# Patient Record
Sex: FEMALE | Race: WHITE | NOT HISPANIC OR LATINO | URBAN - METROPOLITAN AREA
[De-identification: names, ages, dates, MRNs, and addresses within clinical notes are randomized per-mention and may not be internally consistent; named-entity substitution may affect disease eponyms.]

---

## 2020-06-15 ENCOUNTER — OFFICE VISIT (OUTPATIENT)
Dept: URBAN - METROPOLITAN AREA CLINIC 68 | Facility: CLINIC | Age: 75
End: 2020-06-15

## 2020-06-24 ENCOUNTER — OFFICE VISIT (OUTPATIENT)
Dept: URBAN - METROPOLITAN AREA SURGERY CENTER 12 | Facility: SURGERY CENTER | Age: 75
End: 2020-06-24

## 2020-06-25 ENCOUNTER — LAB OUTSIDE AN ENCOUNTER (OUTPATIENT)
Dept: URBAN - METROPOLITAN AREA CLINIC 68 | Facility: CLINIC | Age: 75
End: 2020-06-25

## 2020-06-25 LAB — 01: (no result)

## 2020-06-26 ENCOUNTER — TELEPHONE ENCOUNTER (OUTPATIENT)
Dept: URBAN - METROPOLITAN AREA CLINIC 68 | Facility: CLINIC | Age: 75
End: 2020-06-26

## 2020-07-17 ENCOUNTER — OFFICE VISIT (OUTPATIENT)
Dept: URBAN - METROPOLITAN AREA CLINIC 68 | Facility: CLINIC | Age: 75
End: 2020-07-17

## 2020-09-01 ENCOUNTER — OFFICE VISIT (OUTPATIENT)
Dept: URBAN - METROPOLITAN AREA CLINIC 68 | Facility: CLINIC | Age: 75
End: 2020-09-01

## 2021-03-29 ENCOUNTER — OFFICE VISIT (OUTPATIENT)
Dept: URBAN - METROPOLITAN AREA CLINIC 66 | Facility: CLINIC | Age: 76
End: 2021-03-29

## 2022-03-29 ENCOUNTER — OFFICE VISIT (OUTPATIENT)
Dept: URBAN - METROPOLITAN AREA CLINIC 68 | Facility: CLINIC | Age: 77
End: 2022-03-29

## 2022-06-04 ENCOUNTER — TELEPHONE ENCOUNTER (OUTPATIENT)
Dept: URBAN - METROPOLITAN AREA CLINIC 68 | Facility: CLINIC | Age: 77
End: 2022-06-04

## 2022-06-04 RX ORDER — FLUCONAZOLE 200 MG/1
TABLET ORAL
Qty: 21 | Refills: 0 | OUTPATIENT
Start: 2019-03-04 | End: 2019-03-25

## 2022-06-04 RX ORDER — SODIUM SULFATE, POTASSIUM SULFATE, MAGNESIUM SULFATE 17.5; 3.13; 1.6 G/ML; G/ML; G/ML
SOLUTION, CONCENTRATE ORAL AS DIRECTED
Qty: 1 | Refills: 0 | OUTPATIENT
Start: 2019-01-30 | End: 2019-01-31

## 2022-06-04 RX ORDER — ALBUTEROL SULFATE 90 UG/1
PROAIR HFA( 108 (90 BASE)MCG/ACT INHALATION  AS NEEDED ) INACTIVE -HX ENTRY AEROSOL, METERED RESPIRATORY (INHALATION) AS NEEDED
OUTPATIENT
Start: 2018-01-15

## 2022-06-04 RX ORDER — ESTRADIOL 0.03 MG/D
VIVELLE-DOT( 0.025MG/24HR TRANSDERMAL  2 TIMES A WEEK ) INACTIVE -HX ENTRY PATCH, EXTENDED RELEASE TRANSDERMAL
OUTPATIENT
Start: 2018-01-15

## 2022-06-04 RX ORDER — POLYETHYLENE GLYCOL 3350, SODIUM SULFATE, SODIUM CHLORIDE, POTASSIUM CHLORIDE, ASCORBIC ACID, SODIUM ASCORBATE 7.5-2.691G
KIT ORAL
Qty: 1 | Refills: 0 | OUTPATIENT
Start: 2014-04-01 | End: 2016-03-14

## 2022-06-04 RX ORDER — ROSUVASTATIN CALCIUM 20 MG
CRESTOR( 20MG ORAL  DAILY ) INACTIVE -HX ENTRY TABLET ORAL DAILY
OUTPATIENT
Start: 2018-01-15

## 2022-06-05 ENCOUNTER — TELEPHONE ENCOUNTER (OUTPATIENT)
Dept: URBAN - METROPOLITAN AREA CLINIC 68 | Facility: CLINIC | Age: 77
End: 2022-06-05

## 2022-06-05 RX ORDER — LEVOTHYROXINE SODIUM 75 UG/1
SYNTHROID( 75MCG ORAL  4 TIMES A WEEK ) ACTIVE -HX ENTRY TABLET ORAL
Status: ACTIVE | COMMUNITY
Start: 2021-03-29

## 2022-06-05 RX ORDER — ESOMEPRAZOLE MAGNESIUM 40 MG
NEXIUM( 40MG ORAL  DAILY ) ACTIVE -HX ENTRY CAPSULE,DELAYED RELEASE (ENTERIC COATED) ORAL DAILY
Status: ACTIVE | COMMUNITY
Start: 2021-03-29

## 2022-06-05 RX ORDER — LEVOTHYROXINE SODIUM 88 UG/1
SYNTHROID( 88MCG ORAL  3 TIMES A WEEK ) ACTIVE -HX ENTRY TABLET ORAL
Status: ACTIVE | COMMUNITY
Start: 2021-03-29

## 2022-06-05 RX ORDER — ATORVASTATIN CALCIUM 40 MG/1
ATORVASTATIN CALCIUM( 40MG ORAL  DAILY ) ACTIVE -HX ENTRY TABLET, FILM COATED ORAL DAILY
Status: ACTIVE | COMMUNITY
Start: 2021-03-29

## 2022-06-05 RX ORDER — ALBUTEROL SULFATE 90 UG/1
PROAIR HFA( 108 (90 BASE)MCG/ACT INHALATION  AS NEEDED ) ACTIVE -HX ENTRY AEROSOL, METERED RESPIRATORY (INHALATION) AS NEEDED
Status: ACTIVE | COMMUNITY
Start: 2021-03-29

## 2022-06-05 RX ORDER — MONTELUKAST SODIUM 10 MG/1
SINGULAIR( 10MG ORAL  DAILY ) ACTIVE -HX ENTRY TABLET, FILM COATED ORAL DAILY
Status: ACTIVE | COMMUNITY
Start: 2021-03-29

## 2022-06-05 RX ORDER — ESTRADIOL 0.03 MG/D
ESTRADIOL( 0.025MG/24HR TRANSDERMAL  WEEKLY ) ACTIVE -HX ENTRY PATCH TRANSDERMAL WEEKLY
Status: ACTIVE | COMMUNITY
Start: 2021-03-29

## 2022-06-05 RX ORDER — FLUTICASONE PROPIONATE 50 UG/1
FLUTICASONE PROPIONATE( 50MCG/ACT NASAL  DAILY ) ACTIVE -HX ENTRY SPRAY, METERED NASAL DAILY
Status: ACTIVE | COMMUNITY
Start: 2021-03-29

## 2022-06-05 RX ORDER — FLUTICASONE PROPIONATE 220 UG/1
FLOVENT HFA( 220MCG/ACT INHALATION  1-2 DAILY ) ACTIVE -HX ENTRY AEROSOL, METERED RESPIRATORY (INHALATION)
Status: ACTIVE | COMMUNITY
Start: 2021-03-29

## 2022-06-05 RX ORDER — MONTELUKAST SODIUM 10 MG/1
MONTELUKAST SODIUM( 10MG ORAL  DAILY ) ACTIVE -HX ENTRY TABLET, FILM COATED ORAL DAILY
Status: ACTIVE | COMMUNITY
Start: 2021-03-29

## 2022-06-05 RX ORDER — IMIQUIMOD 50 MG/G
IMIQUIMOD( 5% EXTERNAL  DAILY ) ACTIVE -HX ENTRY CREAM TOPICAL DAILY
Status: ACTIVE | COMMUNITY
Start: 2021-03-29

## 2022-06-05 RX ORDER — RIBOFLAVIN (VITAMIN B2) 100 MG
VITAMIN C( 100MG ORAL  DAILY ) ACTIVE -HX ENTRY TABLET ORAL DAILY
Status: ACTIVE | COMMUNITY
Start: 2021-03-29

## 2022-06-25 ENCOUNTER — TELEPHONE ENCOUNTER (OUTPATIENT)
Age: 77
End: 2022-06-25

## 2022-06-25 RX ORDER — ROSUVASTATIN CALCIUM 20 MG
CRESTOR( 20MG ORAL  DAILY ) INACTIVE -HX ENTRY TABLET ORAL DAILY
OUTPATIENT
Start: 2018-01-15

## 2022-06-25 RX ORDER — POLYETHYLENE GLYCOL 3350, SODIUM SULFATE, SODIUM CHLORIDE, POTASSIUM CHLORIDE, ASCORBIC ACID, SODIUM ASCORBATE 7.5-2.691G
KIT ORAL
Qty: 1 | Refills: 0 | OUTPATIENT
Start: 2014-04-01 | End: 2016-03-14

## 2022-06-25 RX ORDER — ESTRADIOL 0.03 MG/D
VIVELLE-DOT( 0.025MG/24HR TRANSDERMAL  2 TIMES A WEEK ) INACTIVE -HX ENTRY PATCH, EXTENDED RELEASE TRANSDERMAL
OUTPATIENT
Start: 2018-01-15

## 2022-06-25 RX ORDER — CETIRIZINE HYDROCHLORIDE 10 MG/1
ZYRTEC( 10MG ORAL   ) INACTIVE -HX ENTRY TABLET, FILM COATED ORAL
OUTPATIENT
Start: 2018-01-15

## 2022-06-25 RX ORDER — FLUCONAZOLE 200 MG/1
TABLET ORAL
Qty: 21 | Refills: 0 | OUTPATIENT
Start: 2019-03-04 | End: 2019-03-25

## 2022-06-25 RX ORDER — SODIUM SULFATE, POTASSIUM SULFATE, MAGNESIUM SULFATE 17.5; 3.13; 1.6 G/ML; G/ML; G/ML
SOLUTION, CONCENTRATE ORAL AS DIRECTED
Qty: 1 | Refills: 0 | OUTPATIENT
Start: 2019-01-30 | End: 2019-01-31

## 2022-06-26 ENCOUNTER — TELEPHONE ENCOUNTER (OUTPATIENT)
Age: 77
End: 2022-06-26

## 2022-06-26 RX ORDER — LEVOTHYROXINE SODIUM 75 MCG
SYNTHROID( 75MCG ORAL  4 TIMES A WEEK ) ACTIVE -HX ENTRY TABLET ORAL
Status: ACTIVE | COMMUNITY
Start: 2021-03-29

## 2022-06-26 RX ORDER — RIBOFLAVIN (VITAMIN B2) 100 MG
VITAMIN C( 100MG ORAL  DAILY ) ACTIVE -HX ENTRY TABLET ORAL DAILY
Status: ACTIVE | COMMUNITY
Start: 2021-03-29

## 2022-06-26 RX ORDER — ASPIRIN 81 MG/1
LOW-DOSE ASPIRIN( 81MG ORAL  DAILY ) ACTIVE -HX ENTRY TABLET, COATED ORAL DAILY
Status: ACTIVE | COMMUNITY
Start: 2021-03-29

## 2022-06-26 RX ORDER — MONTELUKAST 10 MG/1
MONTELUKAST SODIUM( 10MG ORAL  DAILY ) ACTIVE -HX ENTRY TABLET, FILM COATED ORAL DAILY
Status: ACTIVE | COMMUNITY
Start: 2021-03-29

## 2022-06-26 RX ORDER — ESOMEPRAZOLE MAGNESIUM 40 MG
NEXIUM( 40MG ORAL  DAILY ) ACTIVE -HX ENTRY CAPSULE,DELAYED RELEASE (ENTERIC COATED) ORAL DAILY
Status: ACTIVE | COMMUNITY
Start: 2021-03-29

## 2022-06-26 RX ORDER — FLUTICASONE PROPIONATE 220 UG/1
FLOVENT HFA( 220MCG/ACT INHALATION  1-2 DAILY ) ACTIVE -HX ENTRY AEROSOL, METERED RESPIRATORY (INHALATION)
Status: ACTIVE | COMMUNITY
Start: 2021-03-29

## 2022-06-26 RX ORDER — LEVOTHYROXINE SODIUM 88 MCG
SYNTHROID( 88MCG ORAL  3 TIMES A WEEK ) ACTIVE -HX ENTRY TABLET ORAL
Status: ACTIVE | COMMUNITY
Start: 2021-03-29

## 2022-06-26 RX ORDER — FLUTICASONE PROPIONATE 50 UG/1
FLUTICASONE PROPIONATE( 50MCG/ACT NASAL  DAILY ) ACTIVE -HX ENTRY SPRAY, METERED NASAL DAILY
Status: ACTIVE | COMMUNITY
Start: 2021-03-29

## 2022-06-26 RX ORDER — MONTELUKAST SODIUM 10 MG/1
SINGULAIR( 10MG ORAL  DAILY ) ACTIVE -HX ENTRY TABLET, FILM COATED ORAL DAILY
Status: ACTIVE | COMMUNITY
Start: 2021-03-29

## 2022-06-26 RX ORDER — ATORVASTATIN CALCIUM 40 MG/1
ATORVASTATIN CALCIUM( 40MG ORAL  DAILY ) ACTIVE -HX ENTRY TABLET, FILM COATED ORAL DAILY
Status: ACTIVE | COMMUNITY
Start: 2021-03-29

## 2022-06-26 RX ORDER — CHOLECALCIFEROL (VITAMIN D3) 25 MCG
D3(    DAILY ) ACTIVE -HX ENTRY TABLET,CHEWABLE ORAL DAILY
Status: ACTIVE | COMMUNITY
Start: 2021-03-29

## 2022-06-26 RX ORDER — THIAMINE HCL 100 MG
B COMPLEX(     ) ACTIVE -HX ENTRY TABLET ORAL
Status: ACTIVE | COMMUNITY
Start: 2021-03-29

## 2022-06-26 RX ORDER — ESTRADIOL 0.03 MG/D
ESTRADIOL( 0.025MG/24HR TRANSDERMAL  WEEKLY ) ACTIVE -HX ENTRY PATCH TRANSDERMAL WEEKLY
Status: ACTIVE | COMMUNITY
Start: 2021-03-29

## 2022-06-26 RX ORDER — IMIQUIMOD 12.5 MG/.25G
IMIQUIMOD( 5% EXTERNAL  DAILY ) ACTIVE -HX ENTRY CREAM TOPICAL DAILY
Status: ACTIVE | COMMUNITY
Start: 2021-03-29

## 2022-06-26 RX ORDER — CALCIUM CARBONATE/VITAMIN D3 600 MG-10
CALCIUM(     ) ACTIVE -HX ENTRY TABLET ORAL
Status: ACTIVE | COMMUNITY
Start: 2021-03-29

## 2022-10-25 ENCOUNTER — OFFICE VISIT (OUTPATIENT)
Dept: URBAN - METROPOLITAN AREA CLINIC 68 | Facility: CLINIC | Age: 77
End: 2022-10-25

## 2022-10-25 RX ORDER — LEVOTHYROXINE SODIUM 88 UG/1
SYNTHROID( 88MCG ORAL  3 TIMES A WEEK ) ACTIVE -HX ENTRY TABLET ORAL
Status: ACTIVE | COMMUNITY
Start: 2021-03-29

## 2022-10-25 RX ORDER — ESTRADIOL 0.03 MG/D
ESTRADIOL( 0.025MG/24HR TRANSDERMAL  WEEKLY ) ACTIVE -HX ENTRY PATCH TRANSDERMAL WEEKLY
Status: ACTIVE | COMMUNITY
Start: 2021-03-29

## 2022-10-25 RX ORDER — ATORVASTATIN CALCIUM 40 MG/1
ATORVASTATIN CALCIUM( 40MG ORAL  DAILY ) ACTIVE -HX ENTRY TABLET, FILM COATED ORAL DAILY
Status: ACTIVE | COMMUNITY
Start: 2021-03-29

## 2022-10-25 RX ORDER — RIBOFLAVIN (VITAMIN B2) 100 MG
VITAMIN C( 100MG ORAL  DAILY ) ACTIVE -HX ENTRY TABLET ORAL DAILY
Status: ACTIVE | COMMUNITY
Start: 2021-03-29

## 2022-10-25 RX ORDER — FLUTICASONE PROPIONATE 50 UG/1
FLUTICASONE PROPIONATE( 50MCG/ACT NASAL  DAILY ) ACTIVE -HX ENTRY SPRAY, METERED NASAL DAILY
Status: ACTIVE | COMMUNITY
Start: 2021-03-29

## 2022-10-25 RX ORDER — MONTELUKAST SODIUM 10 MG/1
MONTELUKAST SODIUM( 10MG ORAL  DAILY ) ACTIVE -HX ENTRY TABLET, FILM COATED ORAL DAILY
Status: ACTIVE | COMMUNITY
Start: 2021-03-29

## 2022-10-25 RX ORDER — LEVOTHYROXINE SODIUM 75 UG/1
SYNTHROID( 75MCG ORAL  4 TIMES A WEEK ) ACTIVE -HX ENTRY TABLET ORAL
Status: ACTIVE | COMMUNITY
Start: 2021-03-29

## 2022-10-25 RX ORDER — FLUTICASONE PROPIONATE 220 UG/1
FLOVENT HFA( 220MCG/ACT INHALATION  1-2 DAILY ) ACTIVE -HX ENTRY AEROSOL, METERED RESPIRATORY (INHALATION)
Status: ACTIVE | COMMUNITY
Start: 2021-03-29

## 2022-10-25 RX ORDER — MONTELUKAST SODIUM 10 MG/1
SINGULAIR( 10MG ORAL  DAILY ) ACTIVE -HX ENTRY TABLET, FILM COATED ORAL DAILY
Status: ACTIVE | COMMUNITY
Start: 2021-03-29

## 2022-10-25 RX ORDER — IMIQUIMOD 50 MG/G
IMIQUIMOD( 5% EXTERNAL  DAILY ) ACTIVE -HX ENTRY CREAM TOPICAL DAILY
Status: ACTIVE | COMMUNITY
Start: 2021-03-29

## 2022-10-25 RX ORDER — AMLODIPINE BESYLATE 5 MG/1
1 TABLET TABLET ORAL ONCE A DAY
Status: ACTIVE | COMMUNITY

## 2022-10-25 RX ORDER — ESOMEPRAZOLE MAGNESIUM 40 MG
NEXIUM( 40MG ORAL  DAILY ) ACTIVE -HX ENTRY CAPSULE,DELAYED RELEASE (ENTERIC COATED) ORAL DAILY
Status: ACTIVE | COMMUNITY
Start: 2021-03-29

## 2022-10-25 RX ORDER — ALBUTEROL SULFATE 90 UG/1
PROAIR HFA( 108 (90 BASE)MCG/ACT INHALATION  AS NEEDED ) ACTIVE -HX ENTRY AEROSOL, METERED RESPIRATORY (INHALATION) AS NEEDED
Status: ACTIVE | COMMUNITY
Start: 2021-03-29

## 2022-10-25 NOTE — HPI-MIGRATED HPI
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  Denies nausea, vomits, dysphagia, odynophagia, heartburn, abdominal pain, diarrhea, constipation GI bleeding or weight loss

## 2022-11-07 ENCOUNTER — OFFICE VISIT (OUTPATIENT)
Dept: URBAN - METROPOLITAN AREA SURGERY CENTER 12 | Facility: SURGERY CENTER | Age: 77
End: 2022-11-07

## 2022-11-07 RX ORDER — FLUTICASONE PROPIONATE 50 UG/1
FLUTICASONE PROPIONATE( 50MCG/ACT NASAL  DAILY ) ACTIVE -HX ENTRY SPRAY, METERED NASAL DAILY
Status: ACTIVE | COMMUNITY
Start: 2021-03-29

## 2022-11-07 RX ORDER — LEVOTHYROXINE SODIUM 75 UG/1
SYNTHROID( 75MCG ORAL  4 TIMES A WEEK ) ACTIVE -HX ENTRY TABLET ORAL
Status: ACTIVE | COMMUNITY
Start: 2021-03-29

## 2022-11-07 RX ORDER — MONTELUKAST SODIUM 10 MG/1
SINGULAIR( 10MG ORAL  DAILY ) ACTIVE -HX ENTRY TABLET, FILM COATED ORAL DAILY
Status: ACTIVE | COMMUNITY
Start: 2021-03-29

## 2022-11-07 RX ORDER — ESOMEPRAZOLE MAGNESIUM 40 MG
NEXIUM( 40MG ORAL  DAILY ) ACTIVE -HX ENTRY CAPSULE,DELAYED RELEASE (ENTERIC COATED) ORAL DAILY
Status: ACTIVE | COMMUNITY
Start: 2021-03-29

## 2022-11-07 RX ORDER — FLUTICASONE PROPIONATE 220 UG/1
FLOVENT HFA( 220MCG/ACT INHALATION  1-2 DAILY ) ACTIVE -HX ENTRY AEROSOL, METERED RESPIRATORY (INHALATION)
Status: ACTIVE | COMMUNITY
Start: 2021-03-29

## 2022-11-07 RX ORDER — ESTRADIOL 0.03 MG/D
ESTRADIOL( 0.025MG/24HR TRANSDERMAL  WEEKLY ) ACTIVE -HX ENTRY PATCH TRANSDERMAL WEEKLY
Status: ACTIVE | COMMUNITY
Start: 2021-03-29

## 2022-11-07 RX ORDER — AMLODIPINE BESYLATE 5 MG/1
1 TABLET TABLET ORAL ONCE A DAY
Status: ACTIVE | COMMUNITY

## 2022-11-07 RX ORDER — RIBOFLAVIN (VITAMIN B2) 100 MG
VITAMIN C( 100MG ORAL  DAILY ) ACTIVE -HX ENTRY TABLET ORAL DAILY
Status: ACTIVE | COMMUNITY
Start: 2021-03-29

## 2022-11-07 RX ORDER — ATORVASTATIN CALCIUM 40 MG/1
ATORVASTATIN CALCIUM( 40MG ORAL  DAILY ) ACTIVE -HX ENTRY TABLET, FILM COATED ORAL DAILY
Status: ACTIVE | COMMUNITY
Start: 2021-03-29

## 2022-11-07 RX ORDER — IMIQUIMOD 50 MG/G
IMIQUIMOD( 5% EXTERNAL  DAILY ) ACTIVE -HX ENTRY CREAM TOPICAL DAILY
Status: ACTIVE | COMMUNITY
Start: 2021-03-29

## 2022-11-07 RX ORDER — ALBUTEROL SULFATE 90 UG/1
PROAIR HFA( 108 (90 BASE)MCG/ACT INHALATION  AS NEEDED ) ACTIVE -HX ENTRY AEROSOL, METERED RESPIRATORY (INHALATION) AS NEEDED
Status: ACTIVE | COMMUNITY
Start: 2021-03-29

## 2022-11-07 RX ORDER — LEVOTHYROXINE SODIUM 88 UG/1
SYNTHROID( 88MCG ORAL  3 TIMES A WEEK ) ACTIVE -HX ENTRY TABLET ORAL
Status: ACTIVE | COMMUNITY
Start: 2021-03-29

## 2022-11-07 RX ORDER — MONTELUKAST SODIUM 10 MG/1
MONTELUKAST SODIUM( 10MG ORAL  DAILY ) ACTIVE -HX ENTRY TABLET, FILM COATED ORAL DAILY
Status: ACTIVE | COMMUNITY
Start: 2021-03-29

## 2022-11-22 ENCOUNTER — OFFICE VISIT (OUTPATIENT)
Dept: URBAN - METROPOLITAN AREA CLINIC 66 | Facility: CLINIC | Age: 77
End: 2022-11-22

## 2022-11-22 ENCOUNTER — DASHBOARD ENCOUNTERS (OUTPATIENT)
Age: 77
End: 2022-11-22

## 2022-11-22 RX ORDER — IMIQUIMOD 50 MG/G
IMIQUIMOD( 5% EXTERNAL  DAILY ) ACTIVE -HX ENTRY CREAM TOPICAL DAILY
Status: ACTIVE | COMMUNITY
Start: 2021-03-29

## 2022-11-22 RX ORDER — ESOMEPRAZOLE MAGNESIUM 40 MG
NEXIUM( 40MG ORAL  DAILY ) ACTIVE -HX ENTRY CAPSULE,DELAYED RELEASE (ENTERIC COATED) ORAL DAILY
Status: ACTIVE | COMMUNITY
Start: 2021-03-29

## 2022-11-22 RX ORDER — ESTRADIOL 0.03 MG/D
ESTRADIOL( 0.025MG/24HR TRANSDERMAL  WEEKLY ) ACTIVE -HX ENTRY PATCH TRANSDERMAL WEEKLY
Status: ACTIVE | COMMUNITY
Start: 2021-03-29

## 2022-11-22 RX ORDER — MONTELUKAST SODIUM 10 MG/1
SINGULAIR( 10MG ORAL  DAILY ) ACTIVE -HX ENTRY TABLET, FILM COATED ORAL DAILY
Status: ACTIVE | COMMUNITY
Start: 2021-03-29

## 2022-11-22 RX ORDER — RIBOFLAVIN (VITAMIN B2) 100 MG
VITAMIN C( 100MG ORAL  DAILY ) ACTIVE -HX ENTRY TABLET ORAL DAILY
Status: ACTIVE | COMMUNITY
Start: 2021-03-29

## 2022-11-22 RX ORDER — LEVOTHYROXINE SODIUM 75 UG/1
SYNTHROID( 75MCG ORAL  4 TIMES A WEEK ) ACTIVE -HX ENTRY TABLET ORAL
Status: ACTIVE | COMMUNITY
Start: 2021-03-29

## 2022-11-22 RX ORDER — MONTELUKAST SODIUM 10 MG/1
MONTELUKAST SODIUM( 10MG ORAL  DAILY ) ACTIVE -HX ENTRY TABLET, FILM COATED ORAL DAILY
Status: ACTIVE | COMMUNITY
Start: 2021-03-29

## 2022-11-22 RX ORDER — ATORVASTATIN CALCIUM 40 MG/1
ATORVASTATIN CALCIUM( 40MG ORAL  DAILY ) ACTIVE -HX ENTRY TABLET, FILM COATED ORAL DAILY
Status: ACTIVE | COMMUNITY
Start: 2021-03-29

## 2022-11-22 RX ORDER — ALBUTEROL SULFATE 90 UG/1
PROAIR HFA( 108 (90 BASE)MCG/ACT INHALATION  AS NEEDED ) ACTIVE -HX ENTRY AEROSOL, METERED RESPIRATORY (INHALATION) AS NEEDED
Status: ACTIVE | COMMUNITY
Start: 2021-03-29

## 2022-11-22 RX ORDER — LEVOTHYROXINE SODIUM 88 UG/1
SYNTHROID( 88MCG ORAL  3 TIMES A WEEK ) ACTIVE -HX ENTRY TABLET ORAL
Status: ACTIVE | COMMUNITY
Start: 2021-03-29

## 2022-11-22 RX ORDER — FLUTICASONE PROPIONATE 50 UG/1
FLUTICASONE PROPIONATE( 50MCG/ACT NASAL  DAILY ) ACTIVE -HX ENTRY SPRAY, METERED NASAL DAILY
Status: ACTIVE | COMMUNITY
Start: 2021-03-29

## 2022-11-22 RX ORDER — AMLODIPINE BESYLATE 5 MG/1
1 TABLET TABLET ORAL ONCE A DAY
Status: ACTIVE | COMMUNITY

## 2022-11-22 RX ORDER — FLUTICASONE PROPIONATE 220 UG/1
FLOVENT HFA( 220MCG/ACT INHALATION  1-2 DAILY ) ACTIVE -HX ENTRY AEROSOL, METERED RESPIRATORY (INHALATION)
Status: ACTIVE | COMMUNITY
Start: 2021-03-29

## 2022-11-22 NOTE — HPI-MIGRATED HPI
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  Had recent EGD with biopsies consistent with reflux esophagitis.   Denies nausea, vomits, dysphagia, odynophagia, heartburn, abdominal pain, diarrhea, constipation GI bleeding or weight loss    I have personally performed a face to face diagnostic evaluation on this patient. I have reviewed the ACP note and agree with the history, exam and plan of care, except as noted.